# Patient Record
Sex: MALE | Race: WHITE | NOT HISPANIC OR LATINO
[De-identification: names, ages, dates, MRNs, and addresses within clinical notes are randomized per-mention and may not be internally consistent; named-entity substitution may affect disease eponyms.]

---
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
## 2023-01-17 NOTE — ED PEDIATRIC NURSE NOTE - NS_BH TRG QUESTION1_ED_ALL_ED
Post-Op Assessment Note    CV Status:  Stable  Pain Score: 0    Pain management: adequate     Mental Status:  Alert and awake   Hydration Status:  Euvolemic   PONV Controlled:  Controlled   Airway Patency:  Patent      Post Op Vitals Reviewed: Yes      Staff: CRNA         No notable events documented      BP   129/89   Temp  98   Pulse 99   Resp   16   SpO2   98 No

## 2023-04-01 NOTE — ED PROVIDER NOTE - CROS ED RESP ALL NEG
Regarding: covid-19, vomiting  ----- Message from Ari Merrill sent at 4/1/2023 10:50 AM CDT -----  Patient Name: Katya Mcdonald    Full Name of Provider seen for current symptoms:Dr. Lucien Holland    Symptoms: Patient has COVID-19, vomiting    Pregnant (females aged 13-60. If Yes, how long?):na    Call Back #:[phone]    Clinic Site / Call Center Account # for provider seen for current symptoms:    Which State are you currently located in? (enter State name in Summary field): [place]    Patients needing callback from the RN are informed of the following:   Please be aware the return phone call may come from an unidentified phone number or out of state phone number. Also keep in mind that call back times vary based on call volumes.  If your condition becomes life threatening while you wait for a callback, you should seek immediate medical assistance by calling 911 or going to the Emergency Department for evaluation.     negative - no cough

## 2023-05-18 ENCOUNTER — APPOINTMENT (OUTPATIENT)
Dept: PEDIATRICS | Facility: CLINIC | Age: 18
End: 2023-05-18
Payer: COMMERCIAL

## 2023-05-18 DIAGNOSIS — Z23 ENCOUNTER FOR IMMUNIZATION: ICD-10-CM

## 2023-05-18 DIAGNOSIS — M62.830 MUSCLE SPASM OF BACK: ICD-10-CM

## 2023-05-18 DIAGNOSIS — E55.9 VITAMIN D DEFICIENCY, UNSPECIFIED: ICD-10-CM

## 2023-05-18 DIAGNOSIS — M79.18 MYALGIA, OTHER SITE: ICD-10-CM

## 2023-05-18 DIAGNOSIS — M62.838 OTHER MUSCLE SPASM: ICD-10-CM

## 2023-05-18 PROCEDURE — 99214 OFFICE O/P EST MOD 30 MIN: CPT

## 2023-05-18 NOTE — DISCUSSION/SUMMARY
[FreeTextEntry1] : 18 y/o M with MSK pain- Trapezius muscle spasm/PVM Spasm lower back-\par Suggest Icy/hot and Advil or Motrin every 6-8 hours WA as needed for a few days.\par Advise to use lighter weights with more repetitions and to progress to heavier weights as tolerated.\par ROM exercises given and discussed with Zeyad.\par May consider medical massage.\par Suggested exercises for core and back strengthening.\par Ques addressed.\par Zeyad verbalizes understanding.\par Check back if symptoms do not improve or worsen or if any questions/concerns.\par Time spent patient/chart - 33 mins.

## 2023-05-18 NOTE — PHYSICAL EXAM
[Acute Distress] : no acute distress [Alert] : alert [EOMI] : grossly EOMI [Pink Nasal Mucosa] : pink nasal mucosa [Supple] : supple [Clear to Auscultation Bilaterally] : clear to auscultation bilaterally [Moves All Extremities x 4] : moves all extremities x4 [Normotonic] : normotonic [Warm] : warm [de-identified] : + trapezius muscle spasm behind right shoulder and PVM spasm lower back- lumbosacral

## 2023-05-18 NOTE — HISTORY OF PRESENT ILLNESS
[de-identified] : Back/Shoulder pain [FreeTextEntry6] : Has been working at [employer] at the  and has started to work out the past month.  He is working on machines and lifting weights- as much as he can.  He believes he has good technique. He has some posterior right shoulder discomfort on movement mostly and some low back pain, but mostly when rotating and sidebending. No known injury.  Has FROM.  No ecchymosis/redness or inflammation or deformities noted. NL sleep and NL appetite.  No cold or coughing.  No sore throat.  No V/D/C/loose stools.  No other complaints noted.

## 2023-05-18 NOTE — REVIEW OF SYSTEMS
[Shoulder Problem] : shoulder problems [Shoulder Pain] : shoulder pain [Lower Back Pain] : lower back pain [Negative] : Neurological

## 2023-05-28 NOTE — ED ADULT NURSE REASSESSMENT NOTE - NS ED NURSE REASSESS COMMENT FT1
Patient is admitted to [address] for further psychiatric care. All the belongings and legal documents are taken to the unit by nursing staff. independent

## 2023-06-05 ENCOUNTER — APPOINTMENT (OUTPATIENT)
Dept: PSYCHIATRY | Facility: CLINIC | Age: 18
End: 2023-06-05
Payer: COMMERCIAL

## 2023-06-05 PROCEDURE — 99205 OFFICE O/P NEW HI 60 MIN: CPT | Mod: 95

## 2023-06-07 RX ORDER — VORTIOXETINE 20 MG/1
20 TABLET, FILM COATED ORAL
Refills: 0 | Status: DISCONTINUED | COMMUNITY
Start: 2022-03-30 | End: 2023-06-07

## 2023-06-07 NOTE — PHYSICAL EXAM
[Cooperative] : cooperative [Depressed] : depressed [Anxious] : anxious [Full] : full [Clear] : clear [Linear/Goal Directed] : linear/goal directed [None] : none [Preoccupations/Ruminations] : preoccupations/ruminations [Depressive] : depressive [Average] : average [WNL] : within normal limits [FreeTextEntry7] : social anxiety

## 2023-06-07 NOTE — REASON FOR VISIT
[Other:___] : [unfilled] [Patient] : Patient [Collateral - Name/Contact Info/Relationship:___] : Collateral: [unfilled] [FreeTextEntry1] : "my mom wants me to have this appointment"

## 2023-06-07 NOTE — SOCIAL HISTORY
[FreeTextEntry1] : Lives with bio parents (teachers) and younger sister; reports "decent" in academics through grade school with accommodations for ADHD; recalls hx of close set of friends; graduated high school 2021, attended college 1 semester then withdrew; gap semester followed by retrial 1 semester and withdrew again; Returned home, started working at gym/fitness full time; \par \par Substance: Hx of heavy alcohol binge drinking in college; denies any current\par Hx of THC recreational use

## 2023-06-07 NOTE — HISTORY OF PRESENT ILLNESS
[FreeTextEntry1] : Patient is a 16 yo male, domiciled with bio parents and sister,  high school graduate (2021) , s/p trial year of college, now working full time in a gym; with history of IEP services for adhd, depression and anxiety ( extra time on tests), currently not in outpatient treatment, no prior psychiatric hospitalization,  Hx of self aborted suicide attempt age 13 (s/p ER evaluation), no aggression/violence, + hx of sig substance use (alcohol and THC), no legal issues, no CPS involvement, no trauma/abuse, no sig PMH, presenting today with mother for evaluation of med mgmt.\par \par Pt explains he is here for an evaluation at the request of his mother, however is not interested in med mgmt.  He states he started to have depression and anxiety Sx with ADHD starting in middle school; Pt used to see  and says he's been on multiple medications trials in the past (able to recall several stimulants, lexapro, cymbalta, klonopin, CBD oil, prozac and Lithium). In high school he also received ECT (does not recall amount, "whatever they recommended"); Pt states he felt from middle through high school his parents were more interested in him taking medications than he himself; When he started college freshman year "I let loose, drinking a lot with friends" and was only able to complete a few credits, needed to withdraw; He was taked to the ER for alcholol consulption by friends a few times and was released from the ER after hydration; He then returned home and took a gap semester to "regroup" and returned for another semester last year.  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 make better friends, 2. be able to talk to girls, have a girlfriend, that’s its. "\par Pt identifies low mood and social anxiety as primary concerns at this time; \par \par Pt allows parent to speak few minutes at end of intake: Shares that she is proud of his progress however "always talks negatively" ; Does not have any safety concerns; Wishes pt would consider medication to help anxiety, however respectful of pt autonomy; \par Denies any concern about attending to ADL, or safety concerns.\par \par \par Regarding patients mood, patient endorsed depressed mood, diminished interest or pleasure, no significant weight changes, no issues with sleep. Patient reports healthy energy level. Patient  endorsed some feelings of worthlessness and guilt. Patient denies any issues with focusing and concentrating. Patient denies any suicidal or homicidal ideation, intent or plan. \par Patient denies any manic symptoms.\par \par Patient does describe themselves as a nervous person. Patient endorsed excessive worrying in social settings. Patient denies feeling restless or on edge. They deny feeling fatigued. Patient denies any difficulty concentrating. They do  find themselves more irritable. They deny any muscle tension or sleep disturbances. Patient has  experienced symptoms of panic attack in past, none recent . \par \par Patient denies experiencing any abuse or trauma. They deny any symptoms of PTSD. \par Patient denies any obsessive thoughts or compulsive behaviors. \par Patient denies any perceptual disturbances. No delusions elicited or endorsed during exam. \par  [FreeTextEntry2] : no hospitalizations, 1 self-aborted suicide attempt age 13 via\par trying to choke self with a power cord , + intermittent chronic\par (since 7th-8th grade) passive suicidal ideation \par age 13:  non-suicidal self injury x 1 month of cutting fingertips with shaving razor and biting hands, stratching\par wrists, no aggression/arrests\par ECT in high school \par high school:  therapy with Lilli Santamaria\par \par Previous child psychiatrist : Dr. Nikki Flowers: MDD, MALCOLM, social anxiety, ADHD-C\par \par \par   \par \par  [FreeTextEntry3] : Previous EHR\par multiple medications trials in the past (able to recall "several stimulants, lexapro, cymbalta, klonopin, CBD oil, prozac and Lithium, most recent trintillex") Pt describes difficulty with compliance/adherence on medications in the past.\par Hx of trial of ECT in high school

## 2023-06-07 NOTE — PLAN
[FreeTextEntry4] : -psychoeducation about diagnosis and treatment modalities, alternatives to recommended treatment, risk Vs benefits of treatment and no treatment and alternative treatments.\par - recommend psychotherapy for depression and social anxiety \par -Lab/other tests: appreciate coordination of care with PMD, TSH screen\par -Medication:  Overview of medication consideration for sertraline, pt not interested in medication mgmet at this time.\par -Safety:Educated patient of importance of remaining abstinent from drugs and alcohol; Emergency procedures were discussed: pt. educated to call 911 or go to nearest ER for worsening of symptoms/suicidal/homicidal ideation.\par -Patient given opportunity to ask questions and their questions were answered and they expressed understanding and agreement with above plan.\par -Follow up:  as needed if interested in medication management. \par

## 2023-06-07 NOTE — RISK ASSESSMENT
[Clinical Records] : Clinical Records [Clinical Interview] : Clinical Interview [Yes] : Yes [No] : No [Yes, more than three months ago] : Yes, more than three months ago [Identifies reasons for living] : identifies reasons for living [Supportive social network of family or friends] : supportive social network of family or friends [Positive therapeutic relationships] : positive therapeutic relationships [Responsibility to children, family, or others] : responsibility to children, family, or others [Fear of death/actual act of killing self] : fear of death or the actual act of killing self [Engaged in work or school] : engaged in work or school [TextBox_32] : See above and HPI (hx of SIIP in middle and high school); none since 2019.

## 2023-06-07 NOTE — DISCUSSION/SUMMARY
[FreeTextEntry1] : 18 yo male with hx of MDD recurrent, social anxiety; Hx of alcohol and thc abuse in remission per pt; Protective factors of supportive family and friends, looks to treatment favorably, as such with treatment adherence, prognosis is good.\par  [Low acute suicide risk] : Low acute suicide risk [No] : No [Not clinically indicated] : Safety Plan completed/updated (for individuals at risk): Not clinically indicated

## 2023-09-06 NOTE — ED PEDIATRIC NURSE NOTE - GASTROINTESTINAL WDL
Abdomen soft, nontender, nondistended, bowel sounds present in all 4 quadrants. Propranolol Pregnancy And Lactation Text: This medication is Pregnancy Category C and it isn't known if it is safe during pregnancy. It is excreted in breast milk.

## 2023-09-13 ENCOUNTER — APPOINTMENT (OUTPATIENT)
Dept: PSYCHIATRY | Facility: CLINIC | Age: 18
End: 2023-09-13
Payer: COMMERCIAL

## 2023-09-13 PROCEDURE — 99214 OFFICE O/P EST MOD 30 MIN: CPT | Mod: 95

## 2023-10-17 ENCOUNTER — APPOINTMENT (OUTPATIENT)
Dept: PSYCHIATRY | Facility: CLINIC | Age: 18
End: 2023-10-17
Payer: COMMERCIAL

## 2023-10-17 DIAGNOSIS — F33.1 MAJOR DEPRESSIVE DISORDER, RECURRENT, MODERATE: ICD-10-CM

## 2023-10-17 DIAGNOSIS — F90.2 ATTENTION-DEFICIT HYPERACTIVITY DISORDER, COMBINED TYPE: ICD-10-CM

## 2023-10-17 DIAGNOSIS — F33.9 MAJOR DEPRESSIVE DISORDER, RECURRENT, UNSPECIFIED: ICD-10-CM

## 2023-10-17 DIAGNOSIS — Z86.59 PERSONAL HISTORY OF OTHER MENTAL AND BEHAVIORAL DISORDERS: ICD-10-CM

## 2023-10-17 DIAGNOSIS — F40.10 SOCIAL PHOBIA, UNSPECIFIED: ICD-10-CM

## 2023-10-17 PROCEDURE — 99214 OFFICE O/P EST MOD 30 MIN: CPT | Mod: 95

## 2023-10-17 RX ORDER — SERTRALINE HYDROCHLORIDE 50 MG/1
50 TABLET, FILM COATED ORAL
Qty: 30 | Refills: 2 | Status: ACTIVE | COMMUNITY
Start: 2023-10-17 | End: 1900-01-01

## 2023-10-17 RX ORDER — SERTRALINE 25 MG/1
25 TABLET, FILM COATED ORAL
Qty: 30 | Refills: 1 | Status: DISCONTINUED | COMMUNITY
Start: 2023-09-13 | End: 2023-10-17

## 2024-01-01 NOTE — ED PROVIDER NOTE - CROS ED ROS STATEMENT
How Severe Is It?: mild Is This A New Presentation, Or A Follow-Up?: Follow Up Seborrheic Dermatitis 2024 15:23 all other ROS negative except as per HPI

## 2024-01-19 NOTE — ED PEDIATRIC NURSE NOTE - NSFALLRSKASSESSTYPE_ED_ALL_ED
Previously low  Has not been consistent with supplementation  Will recheck today  Advised will need to continuously supplement long term to maintain levels      Initial (On Arrival)
